# Patient Record
Sex: MALE | Race: WHITE | NOT HISPANIC OR LATINO
[De-identification: names, ages, dates, MRNs, and addresses within clinical notes are randomized per-mention and may not be internally consistent; named-entity substitution may affect disease eponyms.]

---

## 2021-04-29 PROBLEM — Z00.00 ENCOUNTER FOR PREVENTIVE HEALTH EXAMINATION: Status: ACTIVE | Noted: 2021-04-29

## 2021-07-07 ENCOUNTER — APPOINTMENT (OUTPATIENT)
Dept: GASTROENTEROLOGY | Facility: CLINIC | Age: 56
End: 2021-07-07

## 2022-09-19 ENCOUNTER — APPOINTMENT (OUTPATIENT)
Dept: ORTHOPEDIC SURGERY | Facility: CLINIC | Age: 57
End: 2022-09-19

## 2022-09-19 VITALS — HEIGHT: 68 IN | WEIGHT: 175 LBS | BODY MASS INDEX: 26.52 KG/M2

## 2022-09-19 DIAGNOSIS — Z78.9 OTHER SPECIFIED HEALTH STATUS: ICD-10-CM

## 2022-09-19 PROCEDURE — 73000 X-RAY EXAM OF COLLAR BONE: CPT | Mod: LT

## 2022-09-19 PROCEDURE — 73030 X-RAY EXAM OF SHOULDER: CPT | Mod: LT

## 2022-09-19 PROCEDURE — A4565: CPT

## 2022-09-19 PROCEDURE — 99204 OFFICE O/P NEW MOD 45 MIN: CPT
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
## 2022-09-19 NOTE — PHYSICAL EXAM
[Left] : left shoulder [Fracture] : Fracture [de-identified] : Constitutional: The general appearance of the patient is well developed, well nourished, no deformities and well groomed. Normal \par \par Gait: Gait and function is as follows: normal gait. \par \par Skin: Head and neck visualized skin is normal. Left upper extremity visualized skin is normal. Right upper extremity visualized skin is normal. Thoracic Skin of the thoracic spine shows visualized skin is normal. \par \par Cardiovascular: palpable radial pulse bilaterally, good capillary refill in digits of the bilateral upper extremities and no temperature or color changes in the bilateral upper extremities. \par \par Lymphatic: Normal Palpation of lymph nodes in the cervical. \par \par Neurologic: fine motor control in the bilateral upper extremities is intact. Deep Tendon Reflexes in Upper and Lower Extremities Negative Weber's in the bilateral upper extremities. The patient is oriented to time, place and person. Sensation to light touch intact in the bilateral upper extremities. Mood and Affect is normal. \par \par Right Shoulder:  Inspection of the shoulder/upper arm is as follows: There is a full, pain-free, stable range of motion of the shoulder with normal strength and no tenderness to palpation. \par \par Left Shoulder: Inspection of the shoulder/upper arm is as follows: no scapula winging, no biceps deformity and no AC joint deformity. Palpation of the shoulder/upper arm is as follows: There is tenderness at the proximal biceps tendon. Range of motion of the shoulder is as follows: Pain with internal rotation, external rotation, abduction and forward flexion. Strength of the shoulder is as follows: Supraspinatus 4/5. External Rotation 4/5. Internal Rotation 4/5. Deltoid 5/5 Ligament Stability and Special Tests of the shoulder is as follows: Neer test is positive. Alejandra' test is positive. Speed's test is positive. \par \par Neck: \par Inspection / Palpation of the cervical spine is as follows: mild paracervical tenderness. Range of motion of the cervical spine is as follows: moderately decreased range of motion of the cervical spine. Stability testing for the cervical spine is as follows Stable range of motion. \par \par Back, including spine: Inspection / Palpation of the thoracic/lumbar spine is as follows: There is a full, pain free, stable range of motion of the thoracic spine with a normal tone and not tenderness to palpation..\par

## 2022-10-19 ENCOUNTER — APPOINTMENT (OUTPATIENT)
Dept: ORTHOPEDIC SURGERY | Facility: CLINIC | Age: 57
End: 2022-10-19

## 2022-10-19 VITALS — BODY MASS INDEX: 26.52 KG/M2 | HEIGHT: 68 IN | WEIGHT: 175 LBS

## 2022-10-19 DIAGNOSIS — M79.641 PAIN IN RIGHT HAND: ICD-10-CM

## 2022-10-19 DIAGNOSIS — M25.512 PAIN IN LEFT SHOULDER: ICD-10-CM

## 2022-10-19 DIAGNOSIS — M79.643 PAIN IN UNSPECIFIED HAND: ICD-10-CM

## 2022-10-19 PROCEDURE — 73000 X-RAY EXAM OF COLLAR BONE: CPT | Mod: LT

## 2022-10-19 PROCEDURE — 73130 X-RAY EXAM OF HAND: CPT | Mod: RT

## 2022-10-19 PROCEDURE — 99214 OFFICE O/P EST MOD 30 MIN: CPT
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
## 2022-10-19 NOTE — PHYSICAL EXAM
[Left] : left shoulder [Fracture] : Fracture [Right] : right hand [There are no fractures, subluxations or dislocations. No significant abnormalities are seen] : There are no fractures, subluxations or dislocations. No significant abnormalities are seen [No acute displaced fracture or dislocation] : No acute displaced fracture or dislocation [de-identified] : Constitutional: The general appearance of the patient is well developed, well nourished, no deformities and well groomed. Normal \par \par Gait: Gait and function is as follows: normal gait. \par \par Skin: Head and neck visualized skin is normal. Left upper extremity visualized skin is normal. Right upper extremity visualized skin is normal. Thoracic Skin of the thoracic spine shows visualized skin is normal. \par \par Cardiovascular: palpable radial pulse bilaterally, good capillary refill in digits of the bilateral upper extremities and no temperature or color changes in the bilateral upper extremities. \par \par Lymphatic: Normal Palpation of lymph nodes in the cervical. \par \par Neurologic: fine motor control in the bilateral upper extremities is intact. Deep Tendon Reflexes in Upper and Lower Extremities Negative Weber's in the bilateral upper extremities. The patient is oriented to time, place and person. Sensation to light touch intact in the bilateral upper extremities. Mood and Affect is normal. \par \par Right Shoulder:  Inspection of the shoulder/upper arm is as follows: There is a full, pain-free, stable range of motion of the shoulder with normal strength and no tenderness to palpation. \par \par Left Shoulder: Inspection of the shoulder/upper arm is as follows: no scapula winging, no biceps deformity and no AC joint deformity. Palpation of the shoulder/upper arm is as follows: There is tenderness at the proximal biceps tendon. Range of motion of the shoulder is as follows: Pain with internal rotation, external rotation, abduction and forward flexion. Strength of the shoulder is as follows: Supraspinatus 4/5. External Rotation 4/5. Internal Rotation 4/5. Deltoid 5/5 Ligament Stability and Special Tests of the shoulder is as follows: Neer test is positive. Alejandra' test is positive. Speed's test is positive. \par \par Neck: \par Inspection / Palpation of the cervical spine is as follows: mild paracervical tenderness. Range of motion of the cervical spine is as follows: moderately decreased range of motion of the cervical spine. Stability testing for the cervical spine is as follows Stable range of motion. \par \par Back, including spine: Inspection / Palpation of the thoracic/lumbar spine is as follows: There is a full, pain free, stable range of motion of the thoracic spine with a normal tone and not tenderness to palpation..\par

## 2022-11-16 ENCOUNTER — APPOINTMENT (OUTPATIENT)
Dept: ORTHOPEDIC SURGERY | Facility: CLINIC | Age: 57
End: 2022-11-16

## 2022-11-16 VITALS
SYSTOLIC BLOOD PRESSURE: 140 MMHG | WEIGHT: 175 LBS | DIASTOLIC BLOOD PRESSURE: 95 MMHG | HEART RATE: 76 BPM | HEIGHT: 68 IN | BODY MASS INDEX: 26.52 KG/M2

## 2022-11-16 DIAGNOSIS — S42.022A DISPLACED FRACTURE OF SHAFT OF LEFT CLAVICLE, INITIAL ENCOUNTER FOR CLOSED FRACTURE: ICD-10-CM

## 2022-11-16 PROCEDURE — 73000 X-RAY EXAM OF COLLAR BONE: CPT | Mod: LT

## 2022-11-16 PROCEDURE — 99203 OFFICE O/P NEW LOW 30 MIN: CPT

## 2022-11-16 NOTE — REVIEW OF SYSTEMS
Mucinex-D    Steam breathing    Afrin Nasal spray    Tylenol for sinus pressure   [Joint Pain] : joint pain [Joint Stiffness] : joint stiffness [Joint Swelling] : joint swelling [Negative] : Heme/Lymph [FreeTextEntry9] : left shoulder pain
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
## 2022-11-16 NOTE — PHYSICAL EXAM
[de-identified] : Physical Exam:\par General: Well appearing, no acute distress, A&O\par Neurologic: A&Ox3, No focal deficits\par Head: NCAT without abrasions, lacerations, or ecchymosis to head, face, or scalp\par Respiratory: Equal chest wall expansion bilaterally, no accessory muscle use\par Lymphatic: No lymphadenopathy palpated\par Skin: Warm and dry\par Psychiatric: Normal mood and affect\par \par LUE:\par SILT r/m/u\par Fires AIN/PIN/ulnar\par 2+ radial pulse\par brisk capillary refill\par Full shoulder range of motion\par Mild tenderness to palpation over the midshaft the clavicle [de-identified] : Plain films of the left clavicle obtained today show a midshaft fracture with moderate displacement and early callus formation.
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
## 2022-11-21 ENCOUNTER — APPOINTMENT (OUTPATIENT)
Dept: ORTHOPEDIC SURGERY | Facility: CLINIC | Age: 57
End: 2022-11-21

## 2022-11-21 VITALS — WEIGHT: 175 LBS | HEIGHT: 68 IN | BODY MASS INDEX: 26.52 KG/M2

## 2022-11-21 DIAGNOSIS — S63.654A SPRAIN OF METACARPOPHALANGEAL JOINT OF RIGHT RING FINGER, INITIAL ENCOUNTER: ICD-10-CM

## 2022-11-21 PROCEDURE — 99213 OFFICE O/P EST LOW 20 MIN: CPT

## 2022-11-21 NOTE — PHYSICAL EXAM
[FreeTextEntry3] : RIGHT index MP swelling and tenderness.  RIGHT middle MP tenderness.  Pain with middle MP RCL stress with instability
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
## 2022-12-19 ENCOUNTER — APPOINTMENT (OUTPATIENT)
Dept: ORTHOPEDIC SURGERY | Facility: CLINIC | Age: 57
End: 2022-12-19
Payer: COMMERCIAL

## 2022-12-19 VITALS — HEIGHT: 68 IN | BODY MASS INDEX: 26.52 KG/M2 | WEIGHT: 175 LBS

## 2022-12-19 PROCEDURE — 99213 OFFICE O/P EST LOW 20 MIN: CPT

## 2023-01-23 ENCOUNTER — APPOINTMENT (OUTPATIENT)
Dept: ORTHOPEDIC SURGERY | Facility: CLINIC | Age: 58
End: 2023-01-23
Payer: COMMERCIAL

## 2023-01-23 VITALS — HEIGHT: 68 IN | WEIGHT: 175 LBS | BODY MASS INDEX: 26.52 KG/M2

## 2023-01-23 DIAGNOSIS — S63.652D SPRAIN OF METACARPOPHALANGEAL JOINT OF RIGHT MIDDLE FINGER, SUBSEQUENT ENCOUNTER: ICD-10-CM

## 2023-01-23 DIAGNOSIS — S63.654D SPRAIN OF METACARPOPHALANGEAL JOINT OF RIGHT RING FINGER, SUBSEQUENT ENCOUNTER: ICD-10-CM

## 2023-01-23 PROCEDURE — 99213 OFFICE O/P EST LOW 20 MIN: CPT

## 2023-01-24 NOTE — HISTORY OF PRESENT ILLNESS
[Gradual] : gradual [7] : 7 [0] : 0 [Sharp] : sharp [Occasional] : occasional [de-identified] : 57 year old male followed for Sprain of metacarpophalangeal joint of right ring finger. Has been jesenia strapping. Still with swelling and path with ulnar deviation of the finger. \par DOI: 9/18/22 \par  [FreeTextEntry1] : rt hand
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
## 2023-01-24 NOTE — PHYSICAL EXAM
[Right] : right hand [] : no ecchymosis [FreeTextEntry3] : RIGHT index MP swelling and tenderness.  RIGHT middle MP tenderness.  Pain with middle MP RCL stress with instability [FreeTextEntry9] : LT middle MCP 95, RT index MCP 80

## 2023-01-24 NOTE — PHYSICAL EXAM
[Right] : right hand [2nd] : 2nd [3rd] : 3rd [MCP Joint] : MCP joint [FreeTextEntry3] :  Pain with middle MP RCL stress

## 2023-01-24 NOTE — HISTORY OF PRESENT ILLNESS
[8] : 8 [2] : 2 [de-identified] : 57 year old male followed for Sprain of metacarpophalangeal joint of right ring finger. Has been full time finger splinting. \par DOI: 9/18/22  [FreeTextEntry1] : right hand

## 2024-04-16 ENCOUNTER — TRANSCRIPTION ENCOUNTER (OUTPATIENT)
Age: 59
End: 2024-04-16

## 2024-04-16 ENCOUNTER — RESULT REVIEW (OUTPATIENT)
Age: 59
End: 2024-04-16

## 2024-04-16 ENCOUNTER — OUTPATIENT (OUTPATIENT)
Dept: OUTPATIENT SERVICES | Facility: HOSPITAL | Age: 59
LOS: 1 days | End: 2024-04-16
Payer: COMMERCIAL

## 2024-04-16 VITALS
DIASTOLIC BLOOD PRESSURE: 72 MMHG | OXYGEN SATURATION: 98 % | RESPIRATION RATE: 16 BRPM | SYSTOLIC BLOOD PRESSURE: 112 MMHG | HEART RATE: 61 BPM

## 2024-04-16 VITALS
HEART RATE: 60 BPM | TEMPERATURE: 98 F | OXYGEN SATURATION: 100 % | SYSTOLIC BLOOD PRESSURE: 134 MMHG | DIASTOLIC BLOOD PRESSURE: 89 MMHG | RESPIRATION RATE: 16 BRPM

## 2024-04-16 DIAGNOSIS — R55 SYNCOPE AND COLLAPSE: ICD-10-CM

## 2024-04-16 DIAGNOSIS — Z90.49 ACQUIRED ABSENCE OF OTHER SPECIFIED PARTS OF DIGESTIVE TRACT: Chronic | ICD-10-CM

## 2024-04-16 LAB
ANION GAP SERPL CALC-SCNC: 6 MMOL/L — SIGNIFICANT CHANGE UP (ref 5–17)
BUN SERPL-MCNC: 13.7 MG/DL — SIGNIFICANT CHANGE UP (ref 8–20)
CALCIUM SERPL-MCNC: 9.1 MG/DL — SIGNIFICANT CHANGE UP (ref 8.4–10.5)
CHLORIDE SERPL-SCNC: 103 MMOL/L — SIGNIFICANT CHANGE UP (ref 96–108)
CO2 SERPL-SCNC: 29 MMOL/L — SIGNIFICANT CHANGE UP (ref 22–29)
CREAT SERPL-MCNC: 0.75 MG/DL — SIGNIFICANT CHANGE UP (ref 0.5–1.3)
EGFR: 103 ML/MIN/1.73M2 — SIGNIFICANT CHANGE UP
GLUCOSE SERPL-MCNC: 104 MG/DL — HIGH (ref 70–99)
HCT VFR BLD CALC: 44 % — SIGNIFICANT CHANGE UP (ref 39–50)
HGB BLD-MCNC: 15.4 G/DL — SIGNIFICANT CHANGE UP (ref 13–17)
MCHC RBC-ENTMCNC: 32.8 PG — SIGNIFICANT CHANGE UP (ref 27–34)
MCHC RBC-ENTMCNC: 35 GM/DL — SIGNIFICANT CHANGE UP (ref 32–36)
MCV RBC AUTO: 93.6 FL — SIGNIFICANT CHANGE UP (ref 80–100)
PLATELET # BLD AUTO: 174 K/UL — SIGNIFICANT CHANGE UP (ref 150–400)
POTASSIUM SERPL-MCNC: 4.4 MMOL/L — SIGNIFICANT CHANGE UP (ref 3.5–5.3)
POTASSIUM SERPL-SCNC: 4.4 MMOL/L — SIGNIFICANT CHANGE UP (ref 3.5–5.3)
RBC # BLD: 4.7 M/UL — SIGNIFICANT CHANGE UP (ref 4.2–5.8)
RBC # FLD: 12 % — SIGNIFICANT CHANGE UP (ref 10.3–14.5)
SODIUM SERPL-SCNC: 138 MMOL/L — SIGNIFICANT CHANGE UP (ref 135–145)
WBC # BLD: 5.88 K/UL — SIGNIFICANT CHANGE UP (ref 3.8–10.5)
WBC # FLD AUTO: 5.88 K/UL — SIGNIFICANT CHANGE UP (ref 3.8–10.5)

## 2024-04-16 PROCEDURE — 85027 COMPLETE CBC AUTOMATED: CPT

## 2024-04-16 PROCEDURE — 93010 ELECTROCARDIOGRAM REPORT: CPT

## 2024-04-16 PROCEDURE — 93325 DOPPLER ECHO COLOR FLOW MAPG: CPT

## 2024-04-16 PROCEDURE — 93325 DOPPLER ECHO COLOR FLOW MAPG: CPT | Mod: 26

## 2024-04-16 PROCEDURE — 80048 BASIC METABOLIC PNL TOTAL CA: CPT

## 2024-04-16 PROCEDURE — 93320 DOPPLER ECHO COMPLETE: CPT

## 2024-04-16 PROCEDURE — 36415 COLL VENOUS BLD VENIPUNCTURE: CPT

## 2024-04-16 PROCEDURE — 93312 ECHO TRANSESOPHAGEAL: CPT

## 2024-04-16 PROCEDURE — 93312 ECHO TRANSESOPHAGEAL: CPT | Mod: 26

## 2024-04-16 PROCEDURE — 93005 ELECTROCARDIOGRAM TRACING: CPT

## 2024-04-16 PROCEDURE — 93320 DOPPLER ECHO COMPLETE: CPT | Mod: 26

## 2024-04-16 RX ORDER — SERTRALINE 25 MG/1
1 TABLET, FILM COATED ORAL
Refills: 0 | DISCHARGE

## 2024-04-16 RX ORDER — ROSUVASTATIN CALCIUM 5 MG/1
1 TABLET ORAL
Refills: 0 | DISCHARGE

## 2024-04-16 RX ORDER — ASPIRIN/CALCIUM CARB/MAGNESIUM 324 MG
1 TABLET ORAL
Refills: 0 | DISCHARGE

## 2024-04-16 NOTE — DISCHARGE NOTE PROVIDER - NSDCCPTREATMENT_GEN_ALL_CORE_FT
PRINCIPAL PROCEDURE  Procedure: Transesophageal echocardiogram (MARYANN)  Findings and Treatment: + PFO

## 2024-04-16 NOTE — PROGRESS NOTE ADULT - SUBJECTIVE AND OBJECTIVE BOX
[facility] - [facility]                                   [address] [place] [address]                                                      PHONE: [phone]                                                         FAX: [phone]  [email or web address]  ---------------------------------------------------------------------------------------------------------------------------------    Overnight events/patient complaints: here for MARYANN to exclude CSE      No Known Allergies    MEDICATIONS  (STANDING):    MEDICATIONS  (PRN):      Vital Signs Last 24 Hrs  T(C): 36.7 (16 Apr 2024 08:57), Max: 36.7 (16 Apr 2024 08:57)  T(F): 98.1 (16 Apr 2024 08:57), Max: 98.1 (16 Apr 2024 08:57)  HR: 66 (16 Apr 2024 11:25) (60 - 66)  BP: 110/72 (16 Apr 2024 11:25) (96/72 - 134/89)  BP(mean): --  RR: 16 (16 Apr 2024 11:25) (16 - 16)  SpO2: 95% (16 Apr 2024 11:25) (95% - 100%)    Parameters below as of 16 Apr 2024 11:25  Patient On (Oxygen Delivery Method): room air      Daily     Daily   ICU Vital Signs Last 24 Hrs  DARLYN RADFORD  I&O's Detail    I&O's Summary    Drug Dosing Weight  DARLYN RADFORD      PHYSICAL EXAM:  General: Appears alert and cooperative.  HEENT: Head; normocephalic, atraumatic.  Eyes: Pupils reactive, cornea wnl.  Neck: Supple, no nodes adenopathy, no NVD or carotid bruit or thyromegaly.  CARDIOVASCULAR: Normal S1 and S2, No murmur, rub, gallop or lift.   LUNGS: No rales, rhonchi or wheeze. Normal breath sounds bilaterally.  ABDOMEN: Soft, nontender without mass or organomegaly. bowel sounds normoactive.  EXTREMITIES: No clubbing, cyanosis or edema. Distal pulses wnl.   SKIN: warm and dry with normal turgor.  NEURO: Alert/oriented x 3/normal motor exam. No pathologic reflexes.    PSYCH: normal affect.        LABS:                        15.4   5.88  )-----------( 174      ( 16 Apr 2024 08:50 )             44.0     04-16    138  |  103  |  13.7  ----------------------------<  104<H>  4.4   |  29.0  |  0.75    Ca    9.1      16 Apr 2024 08:50      DARLYN GRELLO        Urinalysis Basic - ( 16 Apr 2024 08:50 )    Color: x / Appearance: x / SG: x / pH: x  Gluc: 104 mg/dL / Ketone: x  / Bili: x / Urobili: x   Blood: x / Protein: x / Nitrite: x   Leuk Esterase: x / RBC: x / WBC x   Sq Epi: x / Non Sq Epi: x / Bacteria: x      MARYANN performed with anesthesia sedation  Results:  LVEF 60%   Trace AI  Trace MR  Mild TR normal est PAP  No intracardiac thrombus  Normal aorta  Moderate sized PFO with bidirectional Grade 2 shunting at rest

## 2024-04-16 NOTE — H&P PST ADULT - HISTORY OF PRESENT ILLNESS
58 year old male h/o ALEC and known PFO with positive bubble study a few years ago.   1/2023 pt had CVA with cerebellar infarct.  He was not started on any treatment. Since, he has had near syncopal events.   For MARYANN to assess PFO.         58 year old male h/o ALEC and known PFO with positive bubble study a few years ago.   1/2023 pt had CVA with cerebellar infarct.  He was not started on any treatment. Since, he has had near syncopal events.   For MARYANN to assess PFO.      LABS:                        15.4   5.88  )-----------( 174      ( 16 Apr 2024 08:50 )             44.0   04-16    138  |  103  |  13.7  ----------------------------<  104<H>  4.4   |  29.0  |  0.75    Ca    9.1      16 Apr 2024 08:50

## 2024-04-16 NOTE — H&P PST ADULT - NSICDXFAMILYHX_GEN_ALL_CORE_FT
FAMILY HISTORY:  Father  Still living? Unknown  Family history of bicuspid heart valve, Age at diagnosis: Age Unknown

## 2024-04-16 NOTE — H&P PST ADULT - ASSESSMENT
58 year old male with known PFO and CVA for MARYANN     -NPO maintained 58 year old male with known PFO and CVA for MARYANN     -NPO maintained  -LAB/EKG reviewed  -Consents pending

## 2024-04-16 NOTE — PROGRESS NOTE ADULT - SUBJECTIVE AND OBJECTIVE BOX
[facility] NP    S/P MARYANN:  < from: MARYANN W or WO Ultrasound Enhancing Agent (04.16.24 @ 10:40) >  CONCLUSIONS:      1. Left ventricular cavity is normal in size. Left ventricular wall thickness is normal.   2. There is mild (grade 1) left ventricular diastolic dysfunction, with normal filling pressure.   3. Normal right ventricular cavity size, with normal wall thickness, and normal systolic function.   4. Structurally normal mitral valve with normal leaflet excursion.   5. Trace mitral regurgitation.   6. Trace aortic regurgitation.   7. Structurally normal pulmonic valve with normal leaflet excursion.   8. Mildly enlarged LA, no evidence of SEC nor thrombus in LA or JOSELINE.   9. Agitated saline injection reveals bubbles in the left heart, consistent with a patent foramen ovale with bidirectional shunting.  10. Moderate sized long tunneled PFO with Grade 2 right to left shunt at rest with saline contrast.  11. Left atrial appendage emptying velocites are normal.  12. Structurally normal tricuspid valve with normal leaflet excursion. Mild tricuspid regurgitation.  13. Mild pulmonic regurgitation.  14. Normal pulmonary venous flow.  15. No pericardial effusion seen.    < end of copied text >      -NPO until 1250  -Will follow up as outpt for PFO closure  -cont current medical management  -Discharge to home

## 2024-04-16 NOTE — DISCHARGE NOTE PROVIDER - CARE PROVIDER_API CALL
Lennox Farmer  Cardiology  [address] [place] [address].  [place] [address]  Phone: [phone]  Fax: [phone]  Follow Up Time:

## 2024-04-16 NOTE — DISCHARGE NOTE PROVIDER - HOSPITAL COURSE
58 year old male with known PFO s/p CVA.  Now s/p MARYANN which revealed Mod PFO with bidirectional shunting.  Will f/u for PFO closure as out pt

## 2024-04-16 NOTE — DISCHARGE NOTE NURSING/CASE MANAGEMENT/SOCIAL WORK - PATIENT PORTAL LINK FT
You can access the FollowMyHealth Patient Portal offered by [facility] by registering at the following website: [email or web address]. By joining [facility]’s FollowMyHealth portal, you will also be able to view your health information using other applications (apps) compatible with our system.

## 2024-04-16 NOTE — DISCHARGE NOTE PROVIDER - NSDCMRMEDTOKEN_GEN_ALL_CORE_FT
aspirin 81 mg oral tablet, chewable: 1 tab(s) chewed once a day  Crestor 5 mg oral tablet: 1 tab(s) orally once a day  Zoloft 50 mg oral tablet: 1 tab(s) orally once a day

## 2024-04-16 NOTE — H&P PST ADULT - NSICDXPASTMEDICALHX_GEN_ALL_CORE_FT
PAST MEDICAL HISTORY:  Anxiety     BPH without obstruction/lower urinary tract symptoms     CVA (cerebrovascular accident)     DDD (degenerative disc disease), lumbar     Hyperlipidemia     ALEC on CPAP     PFO (patent foramen ovale)

## 2024-04-16 NOTE — PROGRESS NOTE ADULT - ASSESSMENT
Assessment  Cerebellar CVA in setting of PFO  PFO with bidrectional shunting  ALEC on CPAP       Rec  cont asa/statin  will arrange elective outpt PFO closure

## 2025-06-18 ENCOUNTER — OFFICE (OUTPATIENT)
Dept: URBAN - METROPOLITAN AREA CLINIC 115 | Facility: CLINIC | Age: 60
Setting detail: OPHTHALMOLOGY
End: 2025-06-18
Payer: COMMERCIAL

## 2025-06-18 DIAGNOSIS — H01.002: ICD-10-CM

## 2025-06-18 DIAGNOSIS — H18.452: ICD-10-CM

## 2025-06-18 DIAGNOSIS — H16.223: ICD-10-CM

## 2025-06-18 DIAGNOSIS — H01.005: ICD-10-CM

## 2025-06-18 PROCEDURE — 92285 EXTERNAL OCULAR PHOTOGRAPHY: CPT | Performed by: OPHTHALMOLOGY

## 2025-06-18 PROCEDURE — 92004 COMPRE OPH EXAM NEW PT 1/>: CPT | Performed by: OPHTHALMOLOGY

## 2025-06-18 ASSESSMENT — LID EXAM ASSESSMENTS
OS_BLEPHARITIS: LLL 1+
OD_BLEPHARITIS: RLL 1+

## 2025-06-18 ASSESSMENT — REFRACTION_AUTOREFRACTION
OS_AXIS: 034
OD_SPHERE: -0.75
OD_AXIS: 104
OD_CYLINDER: -1.00
OS_CYLINDER: -0.75
OS_SPHERE: -1.50

## 2025-06-18 ASSESSMENT — TONOMETRY
OD_IOP_MMHG: 13
OS_IOP_MMHG: 13

## 2025-06-18 ASSESSMENT — CONFRONTATIONAL VISUAL FIELD TEST (CVF)
OD_FINDINGS: FULL
OS_FINDINGS: FULL

## 2025-06-18 ASSESSMENT — VISUAL ACUITY
OD_BCVA: 20/20
OS_BCVA: 20/20

## 2025-06-18 ASSESSMENT — SUPERFICIAL PUNCTATE KERATITIS (SPK)
OS_SPK: T
OD_SPK: T